# Patient Record
Sex: MALE | Race: WHITE | NOT HISPANIC OR LATINO | Employment: OTHER | URBAN - METROPOLITAN AREA
[De-identification: names, ages, dates, MRNs, and addresses within clinical notes are randomized per-mention and may not be internally consistent; named-entity substitution may affect disease eponyms.]

---

## 2018-11-20 NOTE — PATIENT DISCUSSION
CONJUNCTIVITIS, VIRAL OS: IGT BETADINE W/RINSE IN OFFICE TODAY TO REDUCE VIRAL LOAD. PRESCRIBED TOBRADEX TID x 1 week then QD x 1 week then D/C. RECOMMENDED COOL COMPRESSES AND ATS BID/PRN. RETURN TO CLINIC AS SCHEDULED/SOONER IF SYMPTOMS INCREASE.

## 2018-11-20 NOTE — PATIENT DISCUSSION
Continue: TobraDex (tobramycin-dexamethasone): drops,suspension: 0.3-0.1% 1 drop three times a day into left eye

## 2018-11-20 NOTE — PATIENT DISCUSSION
Viral Conjunctivitis Counseling: The clinical exam and history are most compatible with a conjunctivitis that is likely viral in nature. I have explained to the patient that there is no treatment for this condition and recommended therapy is to limit symptoms. Therapy includes Tobradex TID, cool compresses, and the use of artificial tears. The patient understands that this can often affect both eyes and is very contagious. The patient needs to be out of work until the symptoms have subsided. Patient was counseled on the importance of good hygiene in order to avoid the spread of the infection. Return for follow-up as scheduled.

## 2019-02-06 ENCOUNTER — NEW PATIENT COMPREHENSIVE (OUTPATIENT)
Dept: URBAN - METROPOLITAN AREA CLINIC 36 | Facility: CLINIC | Age: 69
End: 2019-02-06

## 2019-02-06 DIAGNOSIS — H25.811: ICD-10-CM

## 2019-02-06 DIAGNOSIS — H25.812: ICD-10-CM

## 2019-02-06 DIAGNOSIS — E11.9: ICD-10-CM

## 2019-02-06 PROCEDURE — 92004 COMPRE OPH EXAM NEW PT 1/>: CPT

## 2019-02-06 PROCEDURE — 92015 DETERMINE REFRACTIVE STATE: CPT

## 2019-02-06 ASSESSMENT — VISUAL ACUITY
OS_CC: J1
OD_SC: 20/40+1
OS_PH: 20/25+2
OS_SC: 20/50+2
OD_CC: J1+
OD_PH: 20/25
OS_SC: <J10
OD_SC: <J10

## 2019-02-06 ASSESSMENT — TONOMETRY
OD_IOP_MMHG: 13
OS_IOP_MMHG: 14